# Patient Record
Sex: MALE | Race: WHITE | Employment: OTHER | URBAN - NONMETROPOLITAN AREA
[De-identification: names, ages, dates, MRNs, and addresses within clinical notes are randomized per-mention and may not be internally consistent; named-entity substitution may affect disease eponyms.]

---

## 2021-06-03 ENCOUNTER — HOSPITAL ENCOUNTER (OUTPATIENT)
Dept: MRI IMAGING | Age: 71
Discharge: HOME | End: 2021-06-03
Payer: MEDICARE

## 2021-06-03 DIAGNOSIS — M25.561 ACUTE PAIN OF RIGHT KNEE: ICD-10-CM

## 2021-06-03 PROCEDURE — 73721 MRI JNT OF LWR EXTRE W/O DYE: CPT

## 2021-06-15 ENCOUNTER — OFFICE VISIT (OUTPATIENT)
Dept: ORTHOPEDIC SURGERY | Age: 71
End: 2021-06-15
Payer: MEDICARE

## 2021-06-15 VITALS
OXYGEN SATURATION: 97 % | BODY MASS INDEX: 35.36 KG/M2 | WEIGHT: 220 LBS | RESPIRATION RATE: 16 BRPM | HEIGHT: 66 IN | HEART RATE: 71 BPM

## 2021-06-15 DIAGNOSIS — S83.231A COMPLEX TEAR OF MEDIAL MENISCUS OF RIGHT KNEE AS CURRENT INJURY, INITIAL ENCOUNTER: ICD-10-CM

## 2021-06-15 DIAGNOSIS — M17.11 PRIMARY OSTEOARTHRITIS OF RIGHT KNEE: Primary | ICD-10-CM

## 2021-06-15 PROCEDURE — G8427 DOCREV CUR MEDS BY ELIG CLIN: HCPCS | Performed by: ORTHOPAEDIC SURGERY

## 2021-06-15 PROCEDURE — 20610 DRAIN/INJ JOINT/BURSA W/O US: CPT | Performed by: ORTHOPAEDIC SURGERY

## 2021-06-15 PROCEDURE — G8417 CALC BMI ABV UP PARAM F/U: HCPCS | Performed by: ORTHOPAEDIC SURGERY

## 2021-06-15 PROCEDURE — 1123F ACP DISCUSS/DSCN MKR DOCD: CPT | Performed by: ORTHOPAEDIC SURGERY

## 2021-06-15 PROCEDURE — 3017F COLORECTAL CA SCREEN DOC REV: CPT | Performed by: ORTHOPAEDIC SURGERY

## 2021-06-15 PROCEDURE — 4040F PNEUMOC VAC/ADMIN/RCVD: CPT | Performed by: ORTHOPAEDIC SURGERY

## 2021-06-15 PROCEDURE — 1036F TOBACCO NON-USER: CPT | Performed by: ORTHOPAEDIC SURGERY

## 2021-06-15 PROCEDURE — 99203 OFFICE O/P NEW LOW 30 MIN: CPT | Performed by: ORTHOPAEDIC SURGERY

## 2021-06-15 RX ORDER — ASPIRIN 81 MG/1
81 TABLET, CHEWABLE ORAL DAILY
COMMUNITY

## 2021-06-15 RX ORDER — GLIMEPIRIDE 4 MG/1
4 TABLET ORAL
COMMUNITY

## 2021-06-15 RX ORDER — CHOLECALCIFEROL (VITAMIN D3) 125 MCG
5 CAPSULE ORAL DAILY
COMMUNITY

## 2021-06-15 RX ORDER — EXENATIDE 2 MG/.85ML
INJECTION, SUSPENSION, EXTENDED RELEASE SUBCUTANEOUS
COMMUNITY

## 2021-06-15 RX ORDER — PANTOPRAZOLE SODIUM 40 MG/1
40 TABLET, DELAYED RELEASE ORAL DAILY
COMMUNITY

## 2021-06-15 RX ORDER — LOVASTATIN 10 MG/1
10 TABLET ORAL NIGHTLY
COMMUNITY

## 2021-06-15 RX ORDER — INSULIN GLARGINE 300 U/ML
INJECTION, SOLUTION SUBCUTANEOUS
COMMUNITY

## 2021-06-15 RX ORDER — DONEPEZIL HYDROCHLORIDE 10 MG/1
10 TABLET, FILM COATED ORAL NIGHTLY
COMMUNITY

## 2021-06-15 RX ORDER — ESCITALOPRAM OXALATE 10 MG/1
10 TABLET ORAL DAILY
COMMUNITY

## 2021-06-15 NOTE — PROGRESS NOTES
Patient presents to the office for a consultation to discuss his acute right knee pain. Onset: 6 weeks ago with NKI. Pain is reported as an aching pain along the medial aspect of the right knee with the knee wanting to give out on him at times. Pain is rated in office at a 6/10 today with initial warmth to the extremity and swelling subsiding since the onset of his symptoms. Pain is aggravated by transitioning from a seated to standing position after periods of rest with the patient not attempting to climb stairs. He has been conservatively treating his symptoms by the use of a knee brace, sleeping with a pillow between his legs at night, use of Tylenol or Motrin and ambulatory with a cane. Recent MRI completed revealed a complex degeneration and tearing of the medial meniscus with evidence of a large par ameniscal cyst formation. Moderate patellar cartilage degeneration and medial compartment cartilage degeneration along with a subchondral insufficiency fracture of the lateral femoral condyle with associated edema was detected as well. No previous injury, surgeries, or other conservative therapies reported. MRI KNEE RIGHT WO CONTRAST  Impression   1. Subchondral insufficiency fracture of the lateral femoral condyle with   intense associated edema. 2. Complex degeneration and tearing of the medial meniscus.  Large   parameniscal cyst formation. 3. Moderate patellar cartilage degeneration. 4. Moderate medial compartment cartilage degeneration.

## 2021-06-15 NOTE — PATIENT INSTRUCTIONS
MRI results discussed  Steroid injection  Rest the knee for 24-48 hours   May continue to take Tylenol or Motrin   Rest, ice, and elevate as needed  Weightbearing and activities as tolerated  Follow up in 6 weeks

## 2021-06-16 PROBLEM — S83.231A COMPLEX TEAR OF MEDIAL MENISCUS OF RIGHT KNEE AS CURRENT INJURY: Status: ACTIVE | Noted: 2021-06-16

## 2021-06-16 PROBLEM — M17.11 PRIMARY OSTEOARTHRITIS OF RIGHT KNEE: Status: ACTIVE | Noted: 2021-06-16

## 2021-06-16 ASSESSMENT — ENCOUNTER SYMPTOMS
WHEEZING: 0
COLOR CHANGE: 0
EYE PAIN: 0
CHEST TIGHTNESS: 0
VOMITING: 0
EYE REDNESS: 0
SHORTNESS OF BREATH: 0

## 2021-06-16 NOTE — PROGRESS NOTES
6/15/2021   Chief Complaint   Patient presents with    Knee Pain     right        History of Present Illness:                             Gregery Buerger is a 80 y.o. male who presents today for evaluation of his right knee pain and stiffness. Pain is most significant along the medial joint line worse with weightbearing activities and deep flexion. He has been using a cane to help with ambulation. He denies any falls or injuries. His pain is been present for about 6 weeks and is worse getting up from a seated position or going up and down stairs. His symptoms have not improved with use of a cane and anti-inflammatory medications over-the-counter. He has no history of previous problems to the knee. He was treated appropriately with conservative measures by various primary care physician. X-rays were negative and an MRI was ordered. He is here today for review MRI results and discussion of further treatment options. Patient presents to the office for a consultation to discuss his acute right knee pain. Onset: 6 weeks ago with NKI. Pain is reported as an aching pain along the medial aspect of the right knee with the knee wanting to give out on him at times. Pain is rated in office at a 6/10 today with initial warmth to the extremity and swelling subsiding since the onset of his symptoms. Pain is aggravated by transitioning from a seated to standing position after periods of rest with the patient not attempting to climb stairs. He has been conservatively treating his symptoms by the use of a knee brace, sleeping with a pillow between his legs at night, use of Tylenol or Motrin and ambulatory with a cane. Recent MRI completed revealed a complex degeneration and tearing of the medial meniscus with evidence of a large par ameniscal cyst formation.  Moderate patellar cartilage degeneration and medial compartment cartilage degeneration along with a subchondral insufficiency fracture of the lateral femoral condyle with associated edema was detected as well. No previous injury, surgeries, or other conservative therapies reported.                    Medical History  Patient's medications, allergies, past medical, surgical, social and family histories were reviewed and updated as appropriate. No past medical history on file. No past surgical history on file. No family history on file. Social History     Socioeconomic History    Marital status:      Spouse name: None    Number of children: None    Years of education: None    Highest education level: None   Occupational History    None   Tobacco Use    Smoking status: Former Smoker    Smokeless tobacco: Never Used   Substance and Sexual Activity    Alcohol use: Yes    Drug use: Never    Sexual activity: None   Other Topics Concern    None   Social History Narrative    None     Social Determinants of Health     Financial Resource Strain:     Difficulty of Paying Living Expenses:    Food Insecurity:     Worried About Running Out of Food in the Last Year:     [address] in the Last Year:    Transportation Needs:     Lack of Transportation (Medical):      Lack of Transportation (Non-Medical):    Physical Activity:     Days of Exercise per Week:     Minutes of Exercise per Session:    Stress:     Feeling of Stress :    Social Connections:     Frequency of Communication with Friends and Family:     Frequency of Social Gatherings with Friends and Family:     Attends Jainism Services:     Active Member of Clubs or Organizations:     Attends Club or Organization Meetings:     Marital Status:    Intimate Partner Violence:     Fear of Current or Ex-Partner:     Emotionally Abused:     Physically Abused:     Sexually Abused:      Current Outpatient Medications   Medication Sig Dispense Refill    metFORMIN (GLUCOPHAGE) 500 MG tablet Take 500 mg by mouth      escitalopram (LEXAPRO) 10 MG tablet Take 10 mg by mouth daily      aspirin 81 MG chewable tablet Take 81 mg by mouth daily      donepezil (ARICEPT) 10 MG tablet Take 10 mg by mouth nightly      empagliflozin (JARDIANCE) 25 MG tablet Take by mouth      Exenatide ER (BYDUREON BCISE) 2 MG/0.85ML AUIJ Inject into the skin      pantoprazole (PROTONIX) 40 MG tablet Take 40 mg by mouth daily      glimepiride (AMARYL) 4 MG tablet Take 4 mg by mouth every morning (before breakfast)      lovastatin (MEVACOR) 10 MG tablet Take 10 mg by mouth nightly      Insulin Glargine, 1 Unit Dial, (TOUJEO SOLOSTAR) 300 UNIT/ML SOPN Inject into the skin      melatonin 5 MG TABS tablet Take 5 mg by mouth daily       No current facility-administered medications for this visit. No Known Allergies      Review of Systems   Constitutional: Negative for chills and fever. HENT: Negative for congestion and sneezing. Eyes: Negative for pain and redness. Respiratory: Negative for chest tightness, shortness of breath and wheezing. Cardiovascular: Negative for chest pain and palpitations. Gastrointestinal: Negative for vomiting. Musculoskeletal: Positive for arthralgias. Skin: Negative for color change and rash. Neurological: Negative for weakness and numbness. Psychiatric/Behavioral: Negative for agitation. The patient is not nervous/anxious. Examination:  General Exam:  Vitals: Pulse 71   Resp 16   Ht 5' 6\" (1.676 m)   Wt 220 lb (99.8 kg)   SpO2 97%   BMI 35.51 kg/m²    Physical Exam  Vitals and nursing note reviewed. Constitutional:       Appearance: Normal appearance. He is obese. HENT:      Head: Normocephalic and atraumatic. Eyes:      Conjunctiva/sclera: Conjunctivae normal.      Pupils: Pupils are equal, round, and reactive to light. Pulmonary:      Effort: Pulmonary effort is normal.   Musculoskeletal:      Cervical back: Normal range of motion. Right hip: No deformity, tenderness, bony tenderness or crepitus.  Normal range of motion. Normal strength. Left hip: Normal.      Left knee: No swelling, deformity, effusion, ecchymosis or lacerations. Normal range of motion. No tenderness. No medial joint line or lateral joint line tenderness. No LCL laxity or MCL laxity. Normal alignment and normal meniscus. Comments: Right Lower Extremity:  There is moderate tenderness to palpation throughout the knee most significant along the medial joint line. There is a mild effusion present and mild global swelling at the knee anteriorly. Mild restricted range of motion at the knee with approximately 3 degrees short of full extension and knee flexion up to 130 degrees with pain at the extremes of motion. There is mild crepitation at the knee during active range of motion. There is normal knee alignment. There is 5 out of 5 strength with knee flexion and extension. There is no instability to varus or valgus stress testing or anterior and posterior drawer testing. Sensation is intact to light touch throughout the lower extremity. There is a moderately positive Juana's test with tenderness to palpation and pain along the medial joint line. Skin is intact. Pulses intact    No pain with active range of motion of the hip. Strength and range of motion of the hip are intact. No tenderness to palpation at the hip. Skin:     General: Skin is warm and dry. Neurological:      Mental Status: He is alert and oriented to person, place, and time. Psychiatric:         Mood and Affect: Mood normal.         Behavior: Behavior normal.            Diagnostic testing:  MRI images of the right knee were reviewed by myself and discussed with the patient  MRI KNEE RIGHT WO CONTRAST  Impression   1. Subchondral insufficiency fracture of the lateral femoral condyle with   intense associated edema. 2. Complex degeneration and tearing of the medial meniscus.  Large   parameniscal cyst formation. 3. Moderate patellar cartilage degeneration.    4. Moderate medial compartment cartilage degeneration.        Office Procedures:  Orders Placed This Encounter   Procedures   [id number] Physical Therapy [facility]     Referral Priority:   Routine     Referral Type:   Eval and Treat     Referral Reason:   Specialty Services Required     Requested Specialty:   Physical Therapy     Number of Visits Requested:   1    IN ARTHROCENTESIS ASPIR&/INJ MAJOR JT/BURSA W/O US       Assessment and Plan  1. Right knee tricompartmental primary osteoarthritis    2. Right knee complex medial meniscus tear with parameniscal cyst    I discussed the degenerative findings of the right knee on MRI and exam with the patient. I have recommended maximizing conservative treatments for the arthritic knee condition. I do not recommend any minimally invasive arthroscopic procedures for his meniscus tear. We discussed the use of steroid injections as needed for severe symptoms. Currently patient is having significant pain on a daily basis and this is impacting activities of daily living and ability to get comfortable at rest.  The patient would like to have an injection performed today. Procedure Note, Right Knee Intraarticular Injection:  The right knee was prepped with alcohol and injected intra-articularly with 40 mg of Kenalog and 4 mL of 1% lidocaine through a 22-gauge needle. Sterile Band-Aid was applied. The patient tolerated it well without complications. I have recommended weight loss and maintaining a healthy weight to decrease the forces across the degenerative knee joint. We discussed the importance of maintaining flexibility and strength at the knee. I have advised the patient to have a home exercise program that includes stretching and low impact activities such as walking, biking, or elliptical machines. I recommended physical therapy. I have sent a referral to physical therapy in [place] to assist with rehabilitation of the knee.     I instructed the patient on the use of over-the-counter anti-inflammatory medications. Follow-up in 6 weeks for check of the response to this conservative treatment plan. We briefly discussed possibility for knee surgery if he fails conservative measures. This would consist of a total knee replacement given the severity of his degenerative joint disease.           Electronically signed by Lauren Martin MD on 6/16/2021 at 11:20 AM